# Patient Record
Sex: MALE | Race: OTHER
[De-identification: names, ages, dates, MRNs, and addresses within clinical notes are randomized per-mention and may not be internally consistent; named-entity substitution may affect disease eponyms.]

---

## 2019-11-18 ENCOUNTER — HOSPITAL ENCOUNTER (EMERGENCY)
Dept: HOSPITAL 72 - EMR | Age: 24
Discharge: HOME | End: 2019-11-18
Payer: COMMERCIAL

## 2019-11-18 VITALS — DIASTOLIC BLOOD PRESSURE: 84 MMHG | SYSTOLIC BLOOD PRESSURE: 147 MMHG

## 2019-11-18 VITALS — BODY MASS INDEX: 22.49 KG/M2 | WEIGHT: 135 LBS | HEIGHT: 65 IN

## 2019-11-18 VITALS — SYSTOLIC BLOOD PRESSURE: 147 MMHG | DIASTOLIC BLOOD PRESSURE: 84 MMHG

## 2019-11-18 DIAGNOSIS — S70.01XA: Primary | ICD-10-CM
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
## 2019-11-18 PROCEDURE — 99283 EMERGENCY DEPT VISIT LOW MDM: CPT

## 2019-11-18 PROCEDURE — 72170 X-RAY EXAM OF PELVIS: CPT

## 2019-11-18 NOTE — NUR
ED Nurse Note:



patient walked into ED from work, c/o right lower posterior back/rib pain due 
to slip and fell from stairs at work, landed on the right lower posterior back 
around 1330. patient is alert awake x4 ambulatory, breathing unlabored and 
even, speaking in full sentences.

## 2019-11-18 NOTE — EMERGENCY ROOM REPORT
History of Present Illness


General


Chief Complaint:  Lower Back Pain or Injury


Source:  Patient





Present Illness


HPI


Patient reports that earlier today while he was walking down stairs he tripped 

and fell forward reports that it was approximately 8 steps


He complains of pain to the right hip area


Denies any midline pain denies any chest pain denies any head injury or loss of 

consciousness pain is 5 out of 10 worse with touch patient is ambulatory


Allergies:  


Coded Allergies:  


     No Known Allergies (Unverified , 11/18/19)





Patient History


Past Medical History:  see triage record


Reviewed Nursing Documentation:  PMH: Agreed; PSxH: Agreed





Nursing Documentation-PMH


Past Medical History:  No Stated History





Review of Systems


All Other Systems:  negative except mentioned in HPI





Physical Exam





Vital Signs








  Date Time  Temp Pulse Resp B/P (MAP) Pulse Ox O2 Delivery O2 Flow Rate FiO2


 


11/18/19 14:54 98.2 53 19 147/84 100 Room Air  








Sp02 EP Interpretation:  reviewed, normal


General Appearance:  well appearing, no apparent distress


Head:  normocephalic, atraumatic


Eyes:  bilateral eye PERRL, bilateral eye EOMI


ENT:  normal pharynx


Neck:  supple


Respiratory:  lungs clear


Cardiovascular #1:  regular rate, rhythm


Gastrointestinal:  non tender, soft


Musculoskeletal:  other - Tender on palpation of the right posterior superior 

iliac crest region no obvious hematoma or ecchymosis, otherwise ambulatory 

without focal deficit


Neurologic:  alert, oriented x3, responsive


Skin:  no rash


Lymphatic:  no adenopathy





Medical Decision Making


Diagnostic Impression:  


 Primary Impression:  


 contusion


ER Course


Given the history and presentation x-ray imaging is obtained no obvious acute 

fractures are noted patient is ambulatory


And with the exam at this time appears to be appropriate for close outpatient 

follow-up


Other X-Ray Diagnostic Results


Other X-Ray Diagnostic Results :  


   X-Ray ordered:  Pelvic


   # of Views/Limited Vs Complete:  1 View


   Indication:  Pain


   EP Interpretation:  Yes


   Interpretation:  no dislocation, no soft tissue swelling, no fractures


   Impression:  No acute disease


   Electronically Signed by:  Eric Ferrari DO





Last Vital Signs








  Date Time  Temp Pulse Resp B/P (MAP) Pulse Ox O2 Delivery O2 Flow Rate FiO2


 


11/18/19 16:51 98.2       


 


11/18/19 16:50  53 19 147/84 100 Room Air  








Status:  improved


Disposition:  HOME, SELF-CARE


Condition:  Improved


Scripts


Ibuprofen* (MOTRIN*) 600 Mg Tablet


600 MG ORAL Q8H PRN for For Pain, #20 TAB 0 Refills


   Prov: Eric Ferrari DO         11/18/19 


Ibuprofen* (MOTRIN*) 600 Mg Tablet


600 MG ORAL Q8H PRN for For Pain, #20 TAB 0 Refills


   Prov: Eric Ferrari DO         11/18/19


Referrals:  


[facility],REFERRING (PCP)


Patient Instructions:  Contusion, Easy-to-Read, Back Pain, Adult





Additional Instructions:  


Patient is provided with the discharge instructions notified to follow up with 

primary doctor in the next 2-3 days otherwise return to the er with any 

worsening symptoms.


Please note that this report is being documented using DRAGON technology.  This 

can lead to erroneous entry secondary to incorrect interpretation by the 

dictating instrument.











Eric Ferrari DO Nov 18, 2019 18:43

## 2019-11-18 NOTE — NUR
ER DISCHARGE NOTE:

Patient is cleared to be discharged per ATUL WINCHESTER, pt is aox4, on room 
air, with stable vital signs. pt was given dc and prescription instructions, pt 
was able to verbalize understanding, pt id band  removed without complications. 
pt is able to ambulate with steady gait. pt took all belongings.

## 2019-11-19 NOTE — DIAGNOSTIC IMAGING REPORT
Indication: Pain, trauma

 

Technique: One view of the pelvis

 

Comparison: none

 

Findings: No acute fractures. No dislocations. The joint spaces are preserved.

 

Impression: Negative